# Patient Record
Sex: MALE | Race: WHITE
[De-identification: names, ages, dates, MRNs, and addresses within clinical notes are randomized per-mention and may not be internally consistent; named-entity substitution may affect disease eponyms.]

---

## 2017-09-20 ENCOUNTER — HOSPITAL ENCOUNTER (OUTPATIENT)
Dept: HOSPITAL 17 - HRAD | Age: 22
End: 2017-09-20
Attending: NEUROLOGICAL SURGERY
Payer: COMMERCIAL

## 2017-09-20 DIAGNOSIS — G91.9: Primary | ICD-10-CM

## 2017-09-20 DIAGNOSIS — F07.81: ICD-10-CM

## 2017-09-20 PROCEDURE — 70450 CT HEAD/BRAIN W/O DYE: CPT

## 2017-09-20 NOTE — RADRPT
EXAM DATE/TIME:  09/20/2017 14:20 

 

[facility] COMPARISON:     

CT BRAIN W/O CONTRAST, July 25, 2016, 15:25.

 

 

INDICATIONS :     

Head pressure with shunt.  History of traumatic brain injury. 

                      

 

RADIATION DOSE:     

41.23 CTDIvol (mGy) 

 

 

 

MEDICAL HISTORY :       

TBI

 

SURGICAL HISTORY :       

Shunt

 

ENCOUNTER:      

Initial

 

ACUITY:      

1 month

 

PAIN SCALE:      

3/10

 

LOCATION:       

Bilateral cranial 

 

TECHNIQUE:     

Multiple contiguous axial images were obtained of the head.  Using automated exposure control and adj
ustment of the mA and/or kV according to patient size, radiation dose was kept as low as reasonably a
chievable to obtain optimal diagnostic quality images.   DICOM format image data is available electro
nically for review and comparison.  

 

FINDINGS:     

 

CEREBRUM:     

Shunt entering from the right with stable right basalganglia infarct.  Ventricle size is appropriate.
  There is no new parenchymal hemorrhage.  There is no significant mass effect..

 

POSTERIOR FOSSA:     

The cerebellum and brainstem are intact.  The 4th ventricle is midline.  The cerebellopontine angle i
s unremarkable.

 

EXTRACRANIAL:     

The visualized portion of the orbits is intact.

 

SKULL:     

The calvaria is intact.  No evidence of skull fracture.

 

CONCLUSION:     

Shunt entering from the right stable when compared to 7/25/16 with old infarct basalganglia right tammie
e.

 

 

 

 Ace Justice MD FACR on September 20, 2017 at 14:49           

Board Certified Radiologist.

 This report was verified electronically.

## 2018-03-16 ENCOUNTER — HOSPITAL ENCOUNTER (INPATIENT)
Dept: HOSPITAL 17 - NEPC | Age: 23
LOS: 2 days | Discharge: HOME | DRG: 100 | End: 2018-03-18
Attending: HOSPITALIST | Admitting: HOSPITALIST
Payer: COMMERCIAL

## 2018-03-16 VITALS
OXYGEN SATURATION: 100 % | SYSTOLIC BLOOD PRESSURE: 147 MMHG | HEART RATE: 134 BPM | RESPIRATION RATE: 15 BRPM | DIASTOLIC BLOOD PRESSURE: 93 MMHG

## 2018-03-16 VITALS
TEMPERATURE: 100.6 F | HEART RATE: 135 BPM | RESPIRATION RATE: 12 BRPM | OXYGEN SATURATION: 100 % | SYSTOLIC BLOOD PRESSURE: 122 MMHG | DIASTOLIC BLOOD PRESSURE: 77 MMHG

## 2018-03-16 VITALS
OXYGEN SATURATION: 90 % | DIASTOLIC BLOOD PRESSURE: 84 MMHG | HEART RATE: 130 BPM | RESPIRATION RATE: 18 BRPM | SYSTOLIC BLOOD PRESSURE: 149 MMHG | TEMPERATURE: 98.3 F

## 2018-03-16 VITALS — SYSTOLIC BLOOD PRESSURE: 114 MMHG | DIASTOLIC BLOOD PRESSURE: 60 MMHG

## 2018-03-16 VITALS — OXYGEN SATURATION: 99 %

## 2018-03-16 VITALS — DIASTOLIC BLOOD PRESSURE: 80 MMHG | HEART RATE: 117 BPM | SYSTOLIC BLOOD PRESSURE: 119 MMHG

## 2018-03-16 VITALS — BODY MASS INDEX: 21.9 KG/M2 | WEIGHT: 153 LBS | HEIGHT: 70 IN

## 2018-03-16 VITALS — OXYGEN SATURATION: 98 %

## 2018-03-16 VITALS — OXYGEN SATURATION: 100 %

## 2018-03-16 VITALS — SYSTOLIC BLOOD PRESSURE: 126 MMHG | HEART RATE: 115 BPM | DIASTOLIC BLOOD PRESSURE: 75 MMHG

## 2018-03-16 DIAGNOSIS — Z88.5: ICD-10-CM

## 2018-03-16 DIAGNOSIS — Z87.820: ICD-10-CM

## 2018-03-16 DIAGNOSIS — G40.911: Primary | ICD-10-CM

## 2018-03-16 DIAGNOSIS — G47.01: ICD-10-CM

## 2018-03-16 DIAGNOSIS — R41.89: ICD-10-CM

## 2018-03-16 DIAGNOSIS — S06.9X0S: ICD-10-CM

## 2018-03-16 DIAGNOSIS — Z98.2: ICD-10-CM

## 2018-03-16 DIAGNOSIS — J96.90: ICD-10-CM

## 2018-03-16 DIAGNOSIS — G81.14: ICD-10-CM

## 2018-03-16 LAB
ALBUMIN SERPL-MCNC: 4.2 GM/DL (ref 3.4–5)
ALP SERPL-CCNC: 98 U/L (ref 45–117)
ALT SERPL-CCNC: 74 U/L (ref 12–78)
AST SERPL-CCNC: 36 U/L (ref 15–37)
BASOPHILS # BLD AUTO: 0 TH/MM3 (ref 0–0.2)
BASOPHILS NFR BLD: 0.3 % (ref 0–2)
BILIRUB SERPL-MCNC: 0.8 MG/DL (ref 0.2–1)
BUN SERPL-MCNC: 13 MG/DL (ref 7–18)
CALCIUM SERPL-MCNC: 8.9 MG/DL (ref 8.5–10.1)
CHLORIDE SERPL-SCNC: 103 MEQ/L (ref 98–107)
CREAT SERPL-MCNC: 1.2 MG/DL (ref 0.6–1.3)
EOSINOPHIL # BLD: 0.3 TH/MM3 (ref 0–0.4)
EOSINOPHIL NFR BLD: 2.7 % (ref 0–4)
ERYTHROCYTE [DISTWIDTH] IN BLOOD BY AUTOMATED COUNT: 13.5 % (ref 11.6–17.2)
GFR SERPLBLD BASED ON 1.73 SQ M-ARVRAT: 52 ML/MIN (ref 89–?)
GLUCOSE SERPL-MCNC: 128 MG/DL (ref 74–106)
HCO3 BLD-SCNC: 20 MEQ/L (ref 21–32)
HCT VFR BLD CALC: 47 % (ref 39–51)
HGB BLD-MCNC: 16.1 GM/DL (ref 13–17)
LYMPHOCYTES # BLD AUTO: 3.7 TH/MM3 (ref 1–4.8)
LYMPHOCYTES NFR BLD AUTO: 39 % (ref 9–44)
MCH RBC QN AUTO: 29.9 PG (ref 27–34)
MCHC RBC AUTO-ENTMCNC: 34.2 % (ref 32–36)
MCV RBC AUTO: 87.5 FL (ref 80–100)
MONOCYTE #: 0.7 TH/MM3 (ref 0–0.9)
MONOCYTES NFR BLD: 7.9 % (ref 0–8)
NEUTROPHILS # BLD AUTO: 4.7 TH/MM3 (ref 1.8–7.7)
NEUTROPHILS NFR BLD AUTO: 50.1 % (ref 16–70)
PLATELET # BLD: 247 TH/MM3 (ref 150–450)
PMV BLD AUTO: 8.8 FL (ref 7–11)
PROT SERPL-MCNC: 7.9 GM/DL (ref 6.4–8.2)
RBC # BLD AUTO: 5.38 MIL/MM3 (ref 4.5–5.9)
SODIUM SERPL-SCNC: 142 MEQ/L (ref 136–145)
WBC # BLD AUTO: 9.4 TH/MM3 (ref 4–11)

## 2018-03-16 PROCEDURE — 82805 BLOOD GASES W/O2 SATURATION: CPT

## 2018-03-16 PROCEDURE — 71045 X-RAY EXAM CHEST 1 VIEW: CPT

## 2018-03-16 PROCEDURE — 95819 EEG AWAKE AND ASLEEP: CPT

## 2018-03-16 PROCEDURE — 70450 CT HEAD/BRAIN W/O DYE: CPT

## 2018-03-16 PROCEDURE — 80053 COMPREHEN METABOLIC PANEL: CPT

## 2018-03-16 PROCEDURE — 31500 INSERT EMERGENCY AIRWAY: CPT

## 2018-03-16 PROCEDURE — 84100 ASSAY OF PHOSPHORUS: CPT

## 2018-03-16 PROCEDURE — 94002 VENT MGMT INPAT INIT DAY: CPT

## 2018-03-16 PROCEDURE — 82948 REAGENT STRIP/BLOOD GLUCOSE: CPT

## 2018-03-16 PROCEDURE — 80307 DRUG TEST PRSMV CHEM ANLYZR: CPT

## 2018-03-16 PROCEDURE — 87641 MR-STAPH DNA AMP PROBE: CPT

## 2018-03-16 PROCEDURE — 96368 THER/DIAG CONCURRENT INF: CPT

## 2018-03-16 PROCEDURE — 96365 THER/PROPH/DIAG IV INF INIT: CPT

## 2018-03-16 PROCEDURE — 83735 ASSAY OF MAGNESIUM: CPT

## 2018-03-16 PROCEDURE — 85025 COMPLETE CBC W/AUTO DIFF WBC: CPT

## 2018-03-16 PROCEDURE — 0BH17EZ INSERTION OF ENDOTRACHEAL AIRWAY INTO TRACHEA, VIA NATURAL OR ARTIFICIAL OPENING: ICD-10-PCS | Performed by: EMERGENCY MEDICINE

## 2018-03-16 PROCEDURE — 43752 NASAL/OROGASTRIC W/TUBE PLMT: CPT

## 2018-03-16 PROCEDURE — 99292 CRITICAL CARE ADDL 30 MIN: CPT

## 2018-03-16 PROCEDURE — 36600 WITHDRAWAL OF ARTERIAL BLOOD: CPT

## 2018-03-16 PROCEDURE — 5A1935Z RESPIRATORY VENTILATION, LESS THAN 24 CONSECUTIVE HOURS: ICD-10-PCS | Performed by: INTERNAL MEDICINE

## 2018-03-16 RX ADMIN — STANDARDIZED SENNA CONCENTRATE AND DOCUSATE SODIUM SCH TAB: 8.6; 5 TABLET, FILM COATED ORAL at 21:00

## 2018-03-16 RX ADMIN — METOPROLOL TARTRATE SCH MG: 1 INJECTION, SOLUTION INTRAVENOUS at 23:41

## 2018-03-16 RX ADMIN — Medication SCH ML: at 21:00

## 2018-03-16 RX ADMIN — PHENYTOIN SODIUM SCH MLS/HR: 50 INJECTION INTRAMUSCULAR; INTRAVENOUS at 20:53

## 2018-03-16 RX ADMIN — ENOXAPARIN SODIUM SCH MG: 40 INJECTION SUBCUTANEOUS at 23:09

## 2018-03-16 NOTE — RADRPT
EXAM DATE/TIME:  03/16/2018 17:12 

 

[facility] COMPARISON:     

No previous studies available for comparison.

 

 

INDICATIONS :     

Epilepticus, seizure

                      

 

RADIATION DOSE:     

47.50 CTDIvol (mGy) 

 

 

 

MEDICAL HISTORY :       

TBI 2 yrs ago,

 

SURGICAL HISTORY :      

None. 

 

ENCOUNTER:      

Initial

 

ACUITY:      

1 day

 

PAIN SCALE:      

Non-responsive

 

LOCATION:        

cranial 

 

TECHNIQUE:     

Multiple contiguous axial images were obtained of the head.  Using automated exposure control and adj
ustment of the mA and/or kV according to patient size, radiation dose was kept as low as reasonably a
chievable to obtain optimal diagnostic quality images.   DICOM format image data is available electro
nically for review and comparison.  

 

FINDINGS:     

The examination demonstrates old there is a cortical infarct involving the left frontal cortex and th
e right temporal cortex. There is no acute intracranial hemorrhage. No mass lesion is identified. The
re is a ventriculostomy shunt in satisfactory position. The ventricles are actually quite small in si
ze.

 

No abnormal fluid collections are seen.

 

The appearance of the posterior fossa is unremarkable.

 

The osseous structures of the skull are grossly intact.

 

CONCLUSION:     

1. No acute cranial hemorrhage.

2. 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

3. No acute intracranial abnormality identified.

 

 

 

 Chirag Rosa MD on March 16, 2018 at 17:26           

Board Certified Radiologist.

 This report was verified electronically.

## 2018-03-16 NOTE — PD
HPI


Chief Complaint:  Seizure


Time Seen by Provider:  16:20


Travel History


International Travel<30 days:  No


Contact w/Intl Traveler<30days:  No


Traveled to known affect area:  No





History of Present Illness


HPI


This patient presents emergently by paramedics.  He is critically ill and being 

bagged on the way in.  He has history of traumatic brain injury.  He had a 

seizure 1 time one year ago but is not a typical seizure patient and takes no 

antiepileptics.  Family witnessed him having seizure activity and called the 

paramedics.  He was witnessed to have tonic-clonic shaking activity in the 

extremities by paramedics.  He bit his tongue and was having a lot of bleeding 

in the oral cavity.  He could not control his airway.  He was gurgling and they 

tried to intubate him unsuccessfully.  They gave him etomidate just prior to 

arrival.  He is being bagged when he enters the ER room.  He cannot provide any 

history or review of systems.  He is critically ill in need of emergent airway.





[facility]


Social History


Alcohol Use:  No


Tobacco Use:  No


Substance Use:  No





Allergies-Medications


(Allergen,Severity, Reaction):  


Coded Allergies:  


     morphine (Verified  Allergy, Severe, 3/16/18)


Reported Meds & Prescriptions





Reported Meds & Active Scripts


Active


Reported


Gabapentin 100 Mg Cap 100 Mg PO TID








Review of Systems


ROS Limitations:  Clinical Condition, Altered Mental Status, Unresponsive





Physical Exam


Narrative


GENERAL: Thin unresponsive male with abnormal gurgling respiratory effort.  He 

seems to be choking on blood or secretions.


SKIN: Focused skin assessment reveals no rash and nodules. Skin is Warm and dry.


HEAD: Atraumatic. Normocephalic. 


EYES: Pupils equal and round. No scleral icterus. No injection or drainage. 


ENT: No nasal bleeding or discharge.  Mucous membranes pink and moist.  No gag 

reflex.  Dried blood around the face. 


NECK: Trachea midline. No JVD.  He has an old trach scar


CARDIOVASCULAR: Regular rate and rhythm.  No murmur appreciated.


RESPIRATORY: No accessory muscle use. Clear to auscultation. Breath sounds 

equal bilaterally. 


GASTROINTESTINAL: Abdomen soft, non-tender, nondistended. Hepatic and splenic 

margins not palpable. 


MUSCULOSKELETAL: No obvious deformities. No clubbing.  No cyanosis.  No edema. 


NEUROLOGICAL: GCS 3, he just received etomidate and has had 4 mg of Versed.  No 

gag reflex.  Impossible to test motor strength or sensation.


PSYCHIATRIC: Unresponsive so unable to test  mood and affect; insight and 

judgment poor .





Data


Data


Last Documented VS





Vital Signs








  Date Time  Temp Pulse Resp B/P (MAP) Pulse Ox O2 Delivery O2 Flow Rate FiO2


 


3/16/18 18:20  115  126/75 (92)    


 


3/16/18 17:00     98   100


 


3/16/18 16:32   15     


 


3/16/18 16:23 98.3       








Orders





 Orders


Propofol 500 Mg/50 Ml Inj (Diprivan 500 (3/16/18 16:15)


Phenytoin Inj (Dilantin Inj) (3/16/18 16:30)


Ct Brain W/O Iv Contrast(Rout) (3/16/18 )


Iv Access Insert/Monitor (3/16/18 16:20)


Complete Blood Count With Diff (3/16/18 16:20)


Comprehensive Metabolic Panel (3/16/18 16:20)


Propofol 1000 Mg/100 Ml Inj (Diprivan 10 (3/16/18 16:30)


Alcohol (Ethanol) (3/16/18 16:20)


Drug Screen, Random Urine (3/16/18 16:20)


Urinary Catheter Insert/Apply (3/16/18 16:20)


Arterial Blood Gas (Abg) (3/16/18 )


Chest, Single Ap (3/16/18 )


Succinylcholine Inj (Quelicin Inj) (3/16/18 18:30)





Labs





Laboratory Tests








Test


  3/16/18


17:15 3/16/18


17:30 3/16/18


17:34


 


White Blood Count 9.4 TH/MM3   


 


Red Blood Count 5.38 MIL/MM3   


 


Hemoglobin 16.1 GM/DL   


 


Hematocrit 47.0 %   


 


Mean Corpuscular Volume 87.5 FL   


 


Mean Corpuscular Hemoglobin 29.9 PG   


 


Mean Corpuscular Hemoglobin


Concent 34.2 % 


  


  


 


 


Red Cell Distribution Width 13.5 %   


 


Platelet Count 247 TH/MM3   


 


Mean Platelet Volume 8.8 FL   


 


Neutrophils (%) (Auto) 50.1 %   


 


Lymphocytes (%) (Auto) 39.0 %   


 


Monocytes (%) (Auto) 7.9 %   


 


Eosinophils (%) (Auto) 2.7 %   


 


Basophils (%) (Auto) 0.3 %   


 


Neutrophils # (Auto) 4.7 TH/MM3   


 


Lymphocytes # (Auto) 3.7 TH/MM3   


 


Monocytes # (Auto) 0.7 TH/MM3   


 


Eosinophils # (Auto) 0.3 TH/MM3   


 


Basophils # (Auto) 0.0 TH/MM3   


 


CBC Comment DIFF FINAL   


 


Differential Comment    


 


Blood Urea Nitrogen 13 MG/DL   


 


Creatinine 1.20 MG/DL   


 


Random Glucose 128 MG/DL   


 


Total Protein 7.9 GM/DL   


 


Albumin 4.2 GM/DL   


 


Calcium Level 8.9 MG/DL   


 


Alkaline Phosphatase 98 U/L   


 


Aspartate Amino Transf


(AST/SGOT) 36 U/L 


  


  


 


 


Alanine Aminotransferase


(ALT/SGPT) 74 U/L 


  


  


 


 


Total Bilirubin 0.8 MG/DL   


 


Sodium Level 142 MEQ/L   


 


Potassium Level 3.7 MEQ/L   


 


Chloride Level 103 MEQ/L   


 


Carbon Dioxide Level 20.0 MEQ/L   


 


Anion Gap 19 MEQ/L   


 


Estimat Glomerular Filtration


Rate 52 ML/MIN 


  


  


 


 


Ethyl Alcohol Level


  LESS THAN 3


MG/DL 


  


 


 


Urine Opiates Screen  NEG  


 


Urine Barbiturates Screen  NEG  


 


Urine Amphetamines Screen  NEG  


 


Urine Benzodiazepines Screen  POS  


 


Urine Cocaine Screen  NEG  


 


Urine Cannabinoids Screen  NEG  


 


Blood Gas Puncture Site   RT RADIAL 


 


Blood Gas Patient Temperature   98.6 


 


Blood Gas HCO3   26 mmol/L 


 


Blood Gas Base Excess   1.7 mmol/L 


 


Blood Gas Oxygen Saturation   99 % 


 


Arterial Blood pH   7.40 


 


Arterial Blood Partial


Pressure CO2 


  


  42 mmHg 


 


 


Arterial Blood Partial


Pressure O2 


  


  303 mmHG 


 


 


Arterial Blood Oxygen Content   22.7 Vol % 


 


Arterial Blood


Carboxyhemoglobin 


  


  0.7 % 


 


 


Arterial Blood Methemoglobin   0.7 % 


 


Blood Gas Hemoglobin   15.9 G/DL 


 


Oxygen Delivery Device   VENTILATOR 


 


Blood Gas Ventilator Setting


  


  


  VAC/14/500/+5/100%


 


 


Blood Gas Inspired Oxygen   100 % 











[facility]


Medical Decision Making


Medical Screen Exam Complete:  Yes


Emergency Medical Condition:  Yes


Medical Record Reviewed:  Yes


Differential Diagnosis


Status epilepticus, loss of airway, tonic-clonic seizure


Narrative Course


I have reviewed the patient's electronic medical record.





This patient arrives critically ill.  He is an emergent need of airway








INTUBATION: The patient was put in optimal position for the procedure.  Rapid 

sequence intubation was initiated by me using 100  milligrams of 

succinylcholine IV.  The patient was intubated with a 8-0 cuffed endotracheal 

tube.  Tube placement was confirmed by visualization of the tube and balloon 

passing through the cords, capnometry and subsequent chest x-ray.  Breath 

sounds were equal and well aerated bilaterally postintubation.  No breath 

sounds over stomach.  Patient tolerated procedure well.


Patient had bit his tongue and there was some blood in the oral cavity but 

intubation was not a problem





I have ordered lab studies and brain CT


I have loaded him with 1 g IV Dilantin


I started him on Diprivan drip for sedation





At this point it appears that the seizure has stopped





Brain CT is negative


Lab studies are normal


ABG looks normal


We will wean his oxygen down a bit





I reviewed with mother at bedside and answered her questions


Patient will be admitted to intensive care on ventilator


I discussed with intensivist


Critical Care Narrative


Aggregate critical care time was 80 minutes. Time to perform other separately 

billable procedures was not included in the critical care time. My time did not 

include minutes spent treating any other patients simultaneously or on 

activities that did not directly contribute to the patient's treatment.  





The services I provided to this patient were to treat and/or prevent clinically 

significant deterioration that could result in: Permanent neurologic dysfunction

, brain injury, cardiopulmonary arrest





I provided critical care services requiring my management, as noted below:


Chart data review, documentation time, medication orders and management, vital 

sign assessments/reviewing monitor data, ordering and reviewing lab tests, 

ordering and interpreting/reviewing x-rays and diagnostic studies, care of the 

patient and discussion of the patient with the admitting physicians.





Diagnosis





 Primary Impression:  


 Status epilepticus


 Additional Impressions:  


 Hx of traumatic brain injury


 Airway compromise





Admitting Information


Admitting Physician Requests:  Admit











Aman Neal MD Mar 16, 2018 16:34

## 2018-03-16 NOTE — RADRPT
EXAM DATE/TIME:  03/16/2018 16:36 

 

[facility] COMPARISON:     

No previous studies available for comparison.

 

                     

INDICATIONS :     

Respiratory failure. ET tube placement.

                     

 

MEDICAL HISTORY :     

None.          

 

SURGICAL HISTORY :     

None.   

 

ENCOUNTER:     

Subsequent                                        

 

ACUITY:     

1 day      

 

PAIN SCORE:     

Non-responsive.

 

LOCATION:     

Bilateral chest 

 

FINDINGS:     

Endotracheal tube in good position. NG enters stomach. Right-sided shunt tubing present. Mild basilar
 airspace disease. No significant effusion. No pneumothorax.

 

CONCLUSION:     

1. Support apparatus as above. Mild basilar airspace disease.

 

 

 

 Eron Abreu MD on March 16, 2018 at 17:31           

Board Certified Radiologist.

 This report was verified electronically.

## 2018-03-16 NOTE — HHI.HP
HPI


Service


Critical Care Medicine


Primary Care Physician


Unknown


Admission Diagnosis





status epilepticus,loss of airway, hx TBI


Diagnosis:  


Travel History


International Travel<30 Days:  No


Contact w/Intl Traveler <30 Da:  No


Traveled to Known Affected Are:  No


History of Present Illness


Young gentleman with the history of traumatic brain injury 2 years ago, he was 

on a Procedure prophylaxis for about 1 year, he presents today with the new 

onset of witnessed seizure activity. He was witnessed to have tonic-clonic 

shaking activity in the extremities by paramedics.  He bit his tongue and was 

having a lot of bleeding in the oral cavity.  He could not control his airway.  

There was attempt by paramedics to intubate a patient however unsuccessful.  

Patient received etomidate just prior to arrival to emergency department, was 

backed and was intubated by ED attending for an airway protection.





Review of Systems


ROS


To obtain patient is sedated and intubated





Past Family Social History


Allergies:  


Coded Allergies:  


     morphine (Verified  Allergy, Severe, 3/16/18)


Past Medical History


Traumatic brain injury


Past Surgical History


Craniotomy and  shunt


Reported Medications





Reported Meds & Active Scripts


Active


Reported


Gabapentin 100 Mg Cap 100 Mg PO TID


Active Ordered Medications





Current Medications








 Medications


  (Trade)  Dose


 Ordered  Sig/Josi


 Route


 PRN Reason  Start Time


 Stop Time Status Last Admin


Dose Admin


 


 Propofol  100 ml @ 0


 mls/hr  TITRATE  PRN


 IV


 SEDATION  3/16/18 16:30


    3/16/18 17:51


 


 


 Gabapentin


  (Neurontin)  100 mg  TID


 PO


   3/17/18 09:00


     


 


 


 Sodium Chloride  1,000 ml @ 


 84 mls/hr  [id number]


 IV


   3/16/18 19:13


     


 


 


 Sodium Chloride


  (NS Flush)  2 ml  UNSCH  PRN


 IV FLUSH


 FLUSH AFTER USING IV ACCESS  3/16/18 19:15


     


 


 


 Sodium Chloride


  (NS Flush)  2 ml  BID


 IV FLUSH


   3/16/18 21:00


     


 


 


 Acetaminophen


  (Tylenol)  650 mg  Q6H  PRN


 PO


 PAIN 1-10 AND/OR FEVER >101F  3/16/18 19:15


     


 


 


 Lorazepam


  (Ativan Inj)  1 mg  Q1H  PRN


 IV PUSH


 Seizure  3/16/18 19:15


     


 


 


 Ondansetron HCl


  (Zofran Inj)  4 mg  Q6H  PRN


 IV PUSH


 NAUSEA OR VOMITING  3/16/18 19:15


     


 


 


 Zolpidem Tartrate


  (Ambien)  5 mg  HS  PRN


 PO


 INSOMNIA  3/16/18 19:15


     


 


 


 Albuterol/


 Ipratropium


  (Duoneb Neb)  1 ampule  Q2HR NEB  PRN


 INH


 WHEEZING  3/16/18 19:15


     


 


 


 Enoxaparin Sodium


  (Lovenox Inj)  40 mg  Q24H


 SQ


   3/16/18 21:00


     


 


 


 Miscellaneous


 Information  1  Q361D


 XX


   3/16/18 19:15


     


 


 


 Chlorhexidine


 Gluconate


  (Chlorhexidine


 2% Cloth)  3 pack


 Taper  DAILY@04


 TOP


   3/17/18 04:00


 3/13/19 03:59   


 


 


 Chlorhexidine


 Gluconate


  (Chlorhexidine


 2% Cloth)  3 pack  UNSCH  PRN


 TOP


 HYGIENIC CARE  3/16/18 19:15


     


 


 


 Senna/Docusate


 Sodium


  (Elizabeth-Colace)  1 tab  BID


 PO


   3/16/18 21:00


     


 


 


 Magnesium


 Hydroxide


  (Milk Of


 Magnesia Liq)  30 ml  Q12H  PRN


 PO


 Mild constipation  3/16/18 19:15


     


 


 


 Sennosides


  (Senokot)  17.2 mg  Q12H  PRN


 PO


 Moderate constipation  3/16/18 19:15


     


 


 


 Bisacodyl


  (Dulcolax Supp)  10 mg  DAILY  PRN


 RECTAL


 SEVERE CONSITIPATION  3/16/18 19:15


     


 


 


 Lactulose


  (Lactulose Liq)  30 ml  DAILY  PRN


 PO


 SEVERE CONSITIPATION  3/16/18 19:15


     


 


 


 Levetriacetam


  (Keppra)  500 mg  Q12HR


 PO


   3/16/18 21:00


     


 








Family History


No family history significant of seizure disorder


Social History


No alcohol, tobacco, or illicit drug abuse





Physical Exam


Vital Signs





Vital Signs








  Date Time  Temp Pulse Resp B/P (MAP) Pulse Ox O2 Delivery O2 Flow Rate FiO2


 


3/16/18 18:20  115  126/75 (92)    


 


3/16/18 17:45  117  119/80 (93)    


 


3/16/18 17:00     98   100


 


3/16/18 16:32  134 15 147/93 (111) 100   


 


3/16/18 16:32  130      


 


3/16/18 16:23 98.3 130 18 149/84 (105) 90   


 


3/16/18 16:20     100   100








Physical Exam


GENERAL: Thin unresponsive male sedated and intubated


SKIN: Focused skin assessment reveals no rash and nodules. Skin is Warm and dry.


HEAD: Atraumatic. Normocephalic. 


EYES: Pupils equal and round. No scleral icterus. No injection or drainage. 


ENT: No nasal bleeding or discharge.  Mucous membranes pink and moist.  No gag 

reflex.  Dried blood around the face. 


NECK: Trachea midline. No JVD.  He has an old trach scar


CARDIOVASCULAR: Regular rate and rhythm.  No murmur appreciated.


RESPIRATORY: No accessory muscle use. Clear to auscultation. Breath sounds 

equal bilaterally. 


GASTROINTESTINAL: Abdomen soft, non-tender, nondistended. Hepatic and splenic 

margins not palpable. 


MUSCULOSKELETAL: No obvious deformities. No clubbing.  No cyanosis.  No edema. 


NEUROLOGICAL: GCS 5, sedated and intubated, pupils are symmetrical and reactive 

to light


Laboratory





Laboratory Tests








Test


  3/16/18


17:15 3/16/18


17:30 3/16/18


17:34


 


White Blood Count 9.4   


 


Red Blood Count 5.38   


 


Hemoglobin 16.1   


 


Hematocrit 47.0   


 


Mean Corpuscular Volume 87.5   


 


Mean Corpuscular Hemoglobin 29.9   


 


Mean Corpuscular Hemoglobin


Concent 34.2 


  


  


 


 


Red Cell Distribution Width 13.5   


 


Platelet Count 247   


 


Mean Platelet Volume 8.8   


 


Neutrophils (%) (Auto) 50.1   


 


Lymphocytes (%) (Auto) 39.0   


 


Monocytes (%) (Auto) 7.9   


 


Eosinophils (%) (Auto) 2.7   


 


Basophils (%) (Auto) 0.3   


 


Neutrophils # (Auto) 4.7   


 


Lymphocytes # (Auto) 3.7   


 


Monocytes # (Auto) 0.7   


 


Eosinophils # (Auto) 0.3   


 


Basophils # (Auto) 0.0   


 


CBC Comment DIFF FINAL   


 


Differential Comment    


 


Blood Urea Nitrogen 13   


 


Creatinine 1.20   


 


Random Glucose 128   


 


Total Protein 7.9   


 


Albumin 4.2   


 


Calcium Level 8.9   


 


Alkaline Phosphatase 98   


 


Aspartate Amino Transf


(AST/SGOT) 36 


  


  


 


 


Alanine Aminotransferase


(ALT/SGPT) 74 


  


  


 


 


Total Bilirubin 0.8   


 


Sodium Level 142   


 


Potassium Level 3.7   


 


Chloride Level 103   


 


Carbon Dioxide Level 20.0   


 


Anion Gap 19   


 


Estimat Glomerular Filtration


Rate 52 


  


  


 


 


Ethyl Alcohol Level LESS THAN 3   


 


Urine Opiates Screen  NEG  


 


Urine Barbiturates Screen  NEG  


 


Urine Amphetamines Screen  NEG  


 


Urine Benzodiazepines Screen  POS  


 


Urine Cocaine Screen  NEG  


 


Urine Cannabinoids Screen  NEG  


 


Blood Gas Puncture Site   RT RADIAL 


 


Blood Gas Patient Temperature   98.6 


 


Blood Gas HCO3   26 


 


Blood Gas Base Excess   1.7 


 


Blood Gas Oxygen Saturation   99 


 


Arterial Blood pH   7.40 


 


Arterial Blood Partial


Pressure CO2 


  


  42 


 


 


Arterial Blood Partial


Pressure O2 


  


  303 


 


 


Arterial Blood Oxygen Content   22.7 


 


Arterial Blood


Carboxyhemoglobin 


  


  0.7 


 


 


Arterial Blood Methemoglobin   0.7 


 


Blood Gas Hemoglobin   15.9 


 


Oxygen Delivery Device   VENTILATOR 


 


Blood Gas Ventilator Setting


  


  


  VAC/14/500/+5/100%


 


 


Blood Gas Inspired Oxygen   100 








Result Diagram:  


3/16/18 1715                                                                   

             3/16/18 1715





Imaging





Last 24 hours Impressions








Head CT 3/16/18 0000 Signed





Impressions: 





 Service Date/Time:  2018 17:12 - CONCLUSION:  1. No acute 





 cranial hemorrhage. 2. 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 





 and right temporal cortex. 3. No acute intracranial abnormality identified.   

  





 Chirag Rosa MD 


 


Chest X-Ray 3/16/18 0000 Signed





Impressions: 





 Service Date/Time:  2018 16:36 - CONCLUSION:  1. Support 





 apparatus as above. Mild basilar airspace disease.     Eron Abreu MD 











Septic Shock Reassessment


Septic shock perfusion:  reassessment completed





Caprini VTE Risk Assessment


Caprini VTE Risk Assessment:  Mod/High Risk (score >= 2)


Caprini Risk Assessment Model











 Point Value = 1          Point Value = 2  Point Value = 3  Point Value = 5


 


Age 41-60


Minor surgery


BMI > 25 kg/m2


Swollen legs


Varicose veins


Pregnancy or postpartum


History of unexplained or recurrent


   spontaneous 


Oral contraceptives or hormone


   replacement


Sepsis (< 1 month)


Serious lung disease, including


   pneumonia (< 1 month)


Abnormal pulmonary function


Acute myocardial infarction


Congestive heart failure (< 1 month)


History of inflammatory bowel disease


Medical patient at bed rest Age 61-74


Arthroscopic surgery


Major open surgery (> 45 min)


Laparoscopic surgery (> 45 min)


Malignancy


Confined to bed (> 72 hours)


Immobilizing plaster cast


Central venous access Age >= 75


History of VTE


Family history of VTE


Factor V Leiden


Prothrombin 51314D


Lupus anticoagulant


Anticardiolipin antibodies


Elevated serum homocysteine


Heparin-induced thrombocytopenia


Other congenital or acquired


   thrombophilia Stroke (< 1 month)


Elective arthroplasty


Hip, pelvis, or leg fracture


Acute spinal cord injury (< 1 month)








Prophylaxis Regimen











   Total Risk


Factor Score Risk Level Prophylaxis Regimen


 


0-1      Low Early ambulation


 


2 Moderate Order ONE of the following:


*Sequential Compression Device (SCD)


*Heparin 5000 units SQ BID


 


3-4 Higher Order ONE of the following medications:


*Heparin 5000 units SQ TID


*Enoxaparin/Lovenox 40 mg SQ daily (WT < 150 kg, CrCl > 30 mL/min)


*Enoxaparin/Lovenox 30 mg SQ daily (WT < 150 kg, CrCl > 10-29 mL/min)


*Enoxaparin/Lovenox 30 mg SQ BID (WT < 150 kg, CrCl > 30 mL/min)


AND/OR


*Sequential Compression Device (SCD)


 


5 or more Highest Order ONE of the following medications:


*Heparin 5000 units SQ TID (Preferred with Epidurals)


*Enoxaparin/Lovenox 40 mg SQ daily (WT < 150 kg, CrCl > 30 mL/min)


*Enoxaparin/Lovenox 30 mg SQ daily (WT < 150 kg, CrCl > 10-29 mL/min)


*Enoxaparin/Lovenox 30 mg SQ BID (WT < 150 kg, CrCl > 30 mL/min)


AND


*Sequential Compression Device (SCD)











Assessment and Plan


Assessment and Plan


Respiratory failure


- Intubated for an airway protection


- Attempt to wean off and extubate when seizure-free


- Vent bundle until extubated





Seizure disorder


- Loaded with 1 g of fosphenytoin in the ED


- Keppra 500 by mouth twice a day


- Seizure precaution


- Ativan when necessary





Traumatic brain injury


- Supportive care


- PT and OT


- Gabapentin





DVT GI prophylaxis


- Teds SCDs


- Early aggressive mobilization


- Subcutaneous Lovenox


- Regular diet





Critical Care:


The total critical care time was 35 minutes. Time to perform other separately 

billable procedures was not included in the critical care time.











Vishal Scott MD Mar 16, 2018 7:26 pm

## 2018-03-17 VITALS
OXYGEN SATURATION: 95 % | RESPIRATION RATE: 16 BRPM | TEMPERATURE: 99.4 F | DIASTOLIC BLOOD PRESSURE: 69 MMHG | SYSTOLIC BLOOD PRESSURE: 112 MMHG | HEART RATE: 111 BPM

## 2018-03-17 VITALS
SYSTOLIC BLOOD PRESSURE: 101 MMHG | TEMPERATURE: 98.7 F | HEART RATE: 104 BPM | RESPIRATION RATE: 12 BRPM | OXYGEN SATURATION: 97 % | DIASTOLIC BLOOD PRESSURE: 63 MMHG

## 2018-03-17 VITALS
TEMPERATURE: 98.9 F | RESPIRATION RATE: 12 BRPM | SYSTOLIC BLOOD PRESSURE: 118 MMHG | OXYGEN SATURATION: 97 % | HEART RATE: 113 BPM | DIASTOLIC BLOOD PRESSURE: 57 MMHG

## 2018-03-17 VITALS
SYSTOLIC BLOOD PRESSURE: 109 MMHG | TEMPERATURE: 97.9 F | RESPIRATION RATE: 16 BRPM | OXYGEN SATURATION: 98 % | DIASTOLIC BLOOD PRESSURE: 73 MMHG | HEART RATE: 102 BPM

## 2018-03-17 VITALS
TEMPERATURE: 98.6 F | SYSTOLIC BLOOD PRESSURE: 109 MMHG | RESPIRATION RATE: 17 BRPM | OXYGEN SATURATION: 94 % | HEART RATE: 120 BPM | DIASTOLIC BLOOD PRESSURE: 65 MMHG

## 2018-03-17 VITALS
TEMPERATURE: 98.6 F | RESPIRATION RATE: 15 BRPM | HEART RATE: 116 BPM | SYSTOLIC BLOOD PRESSURE: 104 MMHG | DIASTOLIC BLOOD PRESSURE: 70 MMHG | OXYGEN SATURATION: 97 %

## 2018-03-17 VITALS — TEMPERATURE: 98.9 F

## 2018-03-17 VITALS — HEART RATE: 114 BPM

## 2018-03-17 VITALS — TEMPERATURE: 99.5 F

## 2018-03-17 VITALS — HEART RATE: 118 BPM

## 2018-03-17 LAB
ALBUMIN SERPL-MCNC: 3.6 GM/DL (ref 3.4–5)
ALP SERPL-CCNC: 73 U/L (ref 45–117)
ALT SERPL-CCNC: 56 U/L (ref 12–78)
AST SERPL-CCNC: 33 U/L (ref 15–37)
BASOPHILS # BLD AUTO: 0 TH/MM3 (ref 0–0.2)
BASOPHILS NFR BLD: 0.3 % (ref 0–2)
BILIRUB SERPL-MCNC: 1.5 MG/DL (ref 0.2–1)
BUN SERPL-MCNC: 14 MG/DL (ref 7–18)
CALCIUM SERPL-MCNC: 8.4 MG/DL (ref 8.5–10.1)
CHLORIDE SERPL-SCNC: 107 MEQ/L (ref 98–107)
CREAT SERPL-MCNC: 1.04 MG/DL (ref 0.6–1.3)
EOSINOPHIL # BLD: 0 TH/MM3 (ref 0–0.4)
EOSINOPHIL NFR BLD: 0 % (ref 0–4)
ERYTHROCYTE [DISTWIDTH] IN BLOOD BY AUTOMATED COUNT: 13.4 % (ref 11.6–17.2)
GFR SERPLBLD BASED ON 1.73 SQ M-ARVRAT: 62 ML/MIN (ref 89–?)
GLUCOSE SERPL-MCNC: 117 MG/DL (ref 74–106)
HCO3 BLD-SCNC: 25.6 MEQ/L (ref 21–32)
HCT VFR BLD CALC: 40.8 % (ref 39–51)
HGB BLD-MCNC: 14.1 GM/DL (ref 13–17)
LYMPHOCYTES # BLD AUTO: 1.2 TH/MM3 (ref 1–4.8)
LYMPHOCYTES NFR BLD AUTO: 8.4 % (ref 9–44)
MAGNESIUM SERPL-MCNC: 2 MG/DL (ref 1.5–2.5)
MCH RBC QN AUTO: 29 PG (ref 27–34)
MCHC RBC AUTO-ENTMCNC: 34.4 % (ref 32–36)
MCV RBC AUTO: 84.3 FL (ref 80–100)
MONOCYTE #: 1.1 TH/MM3 (ref 0–0.9)
MONOCYTES NFR BLD: 7.5 % (ref 0–8)
NEUTROPHILS # BLD AUTO: 12.2 TH/MM3 (ref 1.8–7.7)
NEUTROPHILS NFR BLD AUTO: 83.8 % (ref 16–70)
PHOSPHATE SERPL-MCNC: 3.7 MG/DL (ref 2.5–4.9)
PLATELET # BLD: 157 TH/MM3 (ref 150–450)
PMV BLD AUTO: 8.7 FL (ref 7–11)
PROT SERPL-MCNC: 6.3 GM/DL (ref 6.4–8.2)
RBC # BLD AUTO: 4.85 MIL/MM3 (ref 4.5–5.9)
SODIUM SERPL-SCNC: 143 MEQ/L (ref 136–145)
WBC # BLD AUTO: 14.6 TH/MM3 (ref 4–11)

## 2018-03-17 RX ADMIN — METOPROLOL TARTRATE SCH MG: 1 INJECTION, SOLUTION INTRAVENOUS at 12:36

## 2018-03-17 RX ADMIN — GABAPENTIN SCH MG: 100 CAPSULE ORAL at 08:47

## 2018-03-17 RX ADMIN — ENOXAPARIN SODIUM SCH MG: 40 INJECTION SUBCUTANEOUS at 22:29

## 2018-03-17 RX ADMIN — CHLORHEXIDINE GLUCONATE SCH PACK: 500 CLOTH TOPICAL at 04:00

## 2018-03-17 RX ADMIN — STANDARDIZED SENNA CONCENTRATE AND DOCUSATE SODIUM SCH TAB: 8.6; 5 TABLET, FILM COATED ORAL at 21:51

## 2018-03-17 RX ADMIN — STANDARDIZED SENNA CONCENTRATE AND DOCUSATE SODIUM SCH TAB: 8.6; 5 TABLET, FILM COATED ORAL at 08:47

## 2018-03-17 RX ADMIN — PHENYTOIN SODIUM SCH MLS/HR: 50 INJECTION INTRAMUSCULAR; INTRAVENOUS at 05:14

## 2018-03-17 RX ADMIN — GABAPENTIN SCH MG: 100 CAPSULE ORAL at 17:09

## 2018-03-17 RX ADMIN — Medication SCH ML: at 08:47

## 2018-03-17 RX ADMIN — GABAPENTIN SCH MG: 100 CAPSULE ORAL at 12:36

## 2018-03-17 RX ADMIN — Medication SCH ML: at 22:09

## 2018-03-17 RX ADMIN — METOPROLOL TARTRATE SCH MG: 1 INJECTION, SOLUTION INTRAVENOUS at 06:08

## 2018-03-17 NOTE — HHI.PR
Subjective


Remarks


Reviewed the case with the patient's mother at bedside.  History of traumatic 

brain injury and has been on prophylaxis antiepileptic takes.  He follows with 

neurologist, Dr. South.  He was taken off Keppra or year ago when he reportedly 

had an unremarkable EEG.  There has been no new prescribed medications.  No 

seizure activity overnight.


Patient has some cognitive deficits and left-sided hemiparesis.  He denies any 

complaints and states he wants to go home.  His mother provided much of the 

history.





Objective


Vitals





Vital Signs








  Date Time  Temp Pulse Resp B/P (MAP) Pulse Ox O2 Delivery O2 Flow Rate FiO2


 


3/17/18 06:00  118      


 


3/17/18 04:00 98.6 116 15 104/70 (81) 97   


 


3/17/18 04:00  116      


 


3/17/18 02:00  114      


 


3/17/18 00:00  120      


 


3/17/18 00:00 98.6 120 17 109/65 (80) 94   


 


3/16/18 23:00  130      


 


3/16/18 23:00 100.6 135 12 122/77 (92) 100   


 


3/16/18 21:00  130 15 114/60 (78) 100 Nasal Cannula 3.00 


 


3/16/18 19:40     99 Nasal Cannula 3.00 


 


3/16/18 19:38     99 Nasal Cannula 3 


 


3/16/18 19:11     98   35


 


3/16/18 18:20  115  126/75 (92)    


 


3/16/18 17:45  117  119/80 (93)    


 


3/16/18 17:00     98   100


 


3/16/18 16:32  134 15 147/93 (111) 100   


 


3/16/18 16:32  130      


 


3/16/18 16:23 98.3 130 18 149/84 (105) 90   


 


3/16/18 16:20     100   100














I/O      


 


 3/16/18 3/16/18 3/16/18 3/17/18 3/17/18 3/17/18





 07:00 15:00 23:00 07:00 15:00 23:00


 


Intake Total   170 ml 722 ml  


 


Output Total   325 ml 550 ml  


 


Balance   -155 ml 172 ml  


 


      


 


Intake Oral    50 ml  


 


IV Total   170 ml 672 ml  


 


Output Urine Total   325 ml 550 ml  








Result Diagram:  


3/17/18 0352                                                                   

             3/17/18 0352





Objective Remarks


GENERAL: Chronically ill-appearing male, in no apparent distress.


CARDIOVASCULAR: Heart rate around 105 and regular rhythm without murmurs, 

gallops, or rubs. 


RESPIRATORY: Good respiratory efforts. Breath sounds equal and clear to 

auscultation bilaterally.


GASTROINTESTINAL: Abdomen soft, non-tender, non-distended. Normal active bowel 

sounds


MUSCULOSKELETAL: Extremities without cyanosis, or edema.


NEURO: Left-sided hemiparesis with spasticity more pronounced on the left lower 

extremity.  Some cognitive dysfunction and lack of insight into his medical 

condition but can hold simple conversation.


PSYCH: Appropriate mood and affect.





A/P


Problem List:  


(1) Breakthrough seizure


ICD Code:  G40.919 - Epilepsy, unspecified, intractable, without status 

epilepticus


(2) Hx of traumatic brain injury


ICD Code:  Z87.820 - Personal history of traumatic brain injury


Status:  Acute


(3) Airway compromise


ICD Code:  J98.8 - Other specified respiratory disorders


Status:  Acute


Assessment and Plan


Patient presents after having a tonic-clonic seizure, he bit his tongue and was 

unable to protect his airway.  He was intubated in the emergency room.  History 

of seizure X1 around the time of his traumatic brain injury two years ago.  He 

was taken off Keppra a year ago.





Airway compromise:


-Patient was intubated in the emergency room.  He has been quickly weaned off 

and extubated.  Currently on room air.





Seizure disorder: History of TBI, he has a shunt.


- Loaded with 1 g of fosphenytoin in the ED


- Keppra 500 by mouth twice a day


- Will obtain EEG and ask Neurology to see him.  He probably needs to stay on 

antiepileptics.


- Ativan when necessary





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 

hemiparesis and spasticity


- Supportive care


-We'll need to continue outpatient PT and OT


- Gabapentin





FeverX1 yesterday evening:


- No infectious focus but will continue to monitor to ensure he remains 

afebrile at least for 24hrs. 





Insomnia:


- Restoril as needed





DVT prophylaxis


- Subcutaneous Lovenox


- Stool softener as needed.


Discharge Planning


OK to transfer to floor.











Socorro Rocha MD Mar 17, 2018 08:35

## 2018-03-17 NOTE — PD.CONS
History of Present Illness


Service


Neurology


Consult Requested By


medical


Reason for Consult


seizure


Primary Care Physician


Unknown


History of Present Illness


23 y/o m admitted for recurrent seizures. witnessed by mother to be confused 

slumped over. evac called and had witnessed sz by them. stable overnight. 

intubated in er and extubated. loaded with cerebryx and keppra.


hx of tbi and shunt with residual left hemipleg[place] from [place] [facility] [place] on keppra but taken off after 1 year as repeat eeg was nml. 


has been sleep deprived. requesting hypnotic. 


[place] [facility] [place] 








Review of Systems


ROS


10 point negative rest as above





Past Family Social History


Allergies:  


Coded Allergies:  


     morphine (Verified  Allergy, Severe, 3/16/18)


Past Medical History


Traumatic brain injury


Past Surgical History


Craniotomy and  shunt


Reported Medications





Reported Meds & Active Scripts


Active


Reported


Gabapentin 100 Mg Cap 100 Mg PO TID





Family History


No family history significant of seizure disorder





Social History


No alcohol, tobacco, or illicit drug abuse





Review of Systems


All other ROS:  ROS reviewed as documented in chart





Past Family Social History


Allergies:  


Coded Allergies:  


     morphine (Verified  Allergy, Severe, 3/16/18)


Active Ordered Medications





Current Medications








 Medications


  (Trade)  Dose


 Ordered  Sig/Josi


 Route  Start Time


 Stop Time Status Last Admin


 


 Propofol  100 ml @ 0


 mls/hr  TITRATE  PRN


 IV  3/16/18 16:30


    3/16/18 17:51


 


 


  (Neurontin)  100 mg  TID


 PO  3/17/18 09:00


    3/17/18 08:47


 


 


  (NS Flush)  2 ml  UNSCH  PRN


 IV FLUSH  3/16/18 19:15


     


 


 


  (NS Flush)  2 ml  BID


 IV FLUSH  3/16/18 21:00


    3/17/18 08:47


 


 


  (Tylenol)  650 mg  Q6H  PRN


 PO  3/16/18 19:15


    3/16/18 23:09


 


 


  (Ativan Inj)  1 mg  Q1H  PRN


 IV PUSH  3/16/18 19:15


     


 


 


  (Zofran Inj)  4 mg  Q6H  PRN


 IV PUSH  3/16/18 19:15


     


 


 


  (Ambien)  5 mg  HS  PRN


 PO  3/16/18 19:15


    3/16/18 23:09


 


 


  (Duoneb Neb)  1 ampule  Q2HR NEB  PRN


 INH  3/16/18 19:15


     


 


 


  (Lovenox Inj)  40 mg  Q24H


 SQ  3/16/18 21:00


    3/16/18 23:09


 


 


 Miscellaneous


 Information  1  Q361D


 XX  3/16/18 19:15


     


 


 


  (Chlorhexidine


 2% Cloth)  3 pack


 Taper  DAILY@04


 TOP  3/17/18 04:00


 3/13/19 03:59   


 


 


  (Chlorhexidine


 2% Cloth)  3 pack  UNSCH  PRN


 TOP  3/16/18 19:15


     


 


 


  (Elizabeth-Colace)  1 tab  BID


 PO  3/16/18 21:00


    3/17/18 08:47


 


 


  (Milk Of


 Magnesia Liq)  30 ml  Q12H  PRN


 PO  3/16/18 19:15


     


 


 


  (Senokot)  17.2 mg  Q12H  PRN


 PO  3/16/18 19:15


     


 


 


  (Dulcolax Supp)  10 mg  DAILY  PRN


 RECTAL  3/16/18 19:15


     


 


 


  (Lactulose Liq)  30 ml  DAILY  PRN


 PO  3/16/18 19:15


     


 


 


  (Keppra)  500 mg  Q12HR


 PO  3/16/18 21:00


    3/17/18 08:47


 


 


  (Lopressor Inj)  5 mg  Q6H


 IV PUSH  3/16/18 23:45


 3/17/18 11:46  3/17/18 06:08


 


 


  (Restoril)  15 mg  HS  PRN


 PO  3/17/18 21:00


     


 











Exam


I&O / VS





Vital Signs








  Date Time  Temp Pulse Resp B/P (MAP) Pulse Ox O2 Delivery O2 Flow Rate FiO2


 


3/17/18 06:00  118      


 


3/17/18 04:00 98.6 116 15 104/70 (81) 97   


 


3/17/18 04:00  116      


 


3/17/18 02:00  114      


 


3/17/18 00:00  120      


 


3/17/18 00:00 98.6 120 17 109/65 (80) 94   


 


3/16/18 23:00  130      


 


3/16/18 23:00 100.6 135 12 122/77 (92) 100   


 


3/16/18 21:00  130 15 114/60 (78) 100 Nasal Cannula 3.00 


 


3/16/18 19:40     99 Nasal Cannula 3.00 


 


3/16/18 19:38     99 Nasal Cannula 3 


 


3/16/18 19:11     98   35


 


3/16/18 18:20  115  126/75 (92)    


 


3/16/18 17:45  117  119/80 (93)    


 


3/16/18 17:00     98   100


 


3/16/18 16:32  134 15 147/93 (111) 100   


 


3/16/18 16:32  130      


 


3/16/18 16:23 98.3 130 18 149/84 (105) 90   


 


3/16/18 16:20     100   100








General:  Alert and Oriented, No acute distress


Eye:  EOMI


Respiratory:  Non-labored respirations


Cardiology:  Normal rate


Neurologic:  Alert, Oriented


Psychiatric:  Cooperative


Exam Comments


ox 3, monotone speech/flat affect, eomi, vff, left lateral tongue lac, left 

spastic hemiparesis with brisk reflexes and left ankle clonus





Review/Management


Diagnosis/Plan:  


(1) Breakthrough seizure


ICD Codes:  G40.919 - Epilepsy, unspecified, intractable, without status 

epilepticus


Status:  Acute


Plan:  post-traumatic


insomnia may be contributory





recs


resume keppra


valium qhs for spasms and insomnia


eeg





d/c planning from am if he remains sz free


f/u with his neurologist/rehab md





(2) Hx of traumatic brain injury


ICD Codes:  Z87.820 - Personal history of traumatic brain injury


Status:  Chronic


Plan:  4/2016 with  shunt, residual left spastic hemiparesis














Santiago Callahan MD Mar 17, 2018 09:58

## 2018-03-17 NOTE — MG
cc:

Santiago Callahan MD

****

 

 

 

EEG RECORD #

 

A 22-year-old, history of previous traumatic brain injury, seizure.

 

Asymmetric right hemispheric slowing, 1-3 Hz in the right hemisphere. 

Left side some delta and alpha, beta frequencies, 20-70 microvolts.  

Some blinking artifact.  Photic stimulation reduced driving.  Single 

lead EKG showing sinus rhythm.

 

INTERPRETATION:

Moderate asymmetric right hemispheric slowing suggestive of structural

lesion.  No active seizure activity.  Clinical correlation.

 

 

__________________________________

Santiago Callahan MD

 

 

MG/rt

D: 03/17/2018, 09:09 PM

T: 03/17/2018, 09:25 PM

Visit #: [id number]

Job #: [id number]

## 2018-03-18 VITALS
SYSTOLIC BLOOD PRESSURE: 109 MMHG | HEART RATE: 113 BPM | TEMPERATURE: 98.7 F | DIASTOLIC BLOOD PRESSURE: 59 MMHG | RESPIRATION RATE: 18 BRPM | OXYGEN SATURATION: 96 %

## 2018-03-18 VITALS
DIASTOLIC BLOOD PRESSURE: 63 MMHG | TEMPERATURE: 98.9 F | RESPIRATION RATE: 16 BRPM | SYSTOLIC BLOOD PRESSURE: 113 MMHG | HEART RATE: 103 BPM | OXYGEN SATURATION: 97 %

## 2018-03-18 RX ADMIN — GABAPENTIN SCH MG: 100 CAPSULE ORAL at 08:50

## 2018-03-18 RX ADMIN — CHLORHEXIDINE GLUCONATE SCH PACK: 500 CLOTH TOPICAL at 04:00

## 2018-03-18 RX ADMIN — STANDARDIZED SENNA CONCENTRATE AND DOCUSATE SODIUM SCH TAB: 8.6; 5 TABLET, FILM COATED ORAL at 08:50

## 2018-03-18 NOTE — HHI.DS
__________________________________________________





Discharge Summary


Admission Date


Mar 16, 2018 at 18:37


Discharge Date:  Mar 18, 2018


Admitting Diagnosis





status epilepticus,loss of airway, hx TBI





(1) Breakthrough seizure


ICD Code:  G40.919 - Epilepsy, unspecified, intractable, without status 

epilepticus


Status:  Acute


(2) Hx of traumatic brain injury


ICD Code:  Z87.820 - Personal history of traumatic brain injury


Status:  Chronic


(3) Airway compromise


ICD Code:  J98.8 - Other specified respiratory disorders


Status:  Acute


Procedures


None


Brief History - From Admission


HPI from the admitting physician


Young gentleman with the history of traumatic brain injury 2 years ago, he was 

on a Procedure prophylaxis for about 1 year, he presents today with the new 

onset of witnessed seizure activity. He was witnessed to have tonic-clonic 

shaking activity in the extremities by paramedics.  He bit his tongue and was 

having a lot of bleeding in the oral cavity.  He could not control his airway.  

There was attempt by paramedics to intubate a patient however unsuccessful.  

Patient received etomidate just prior to arrival to emergency department, was 

backed and was intubated by ED attending for an airway protection.


CBC/BMP:  


3/17/18 0352                                                                   

             3/17/18 0352





Significant Findings





Laboratory Tests








Test


  3/16/18


17:15 3/16/18


17:30 3/16/18


17:34 3/16/18


23:20


 


Random Glucose


  128 MG/DL


() 


  


  


 


 


Carbon Dioxide Level


  20.0 MEQ/L


(21.0-32.0) 


  


  


 


 


Anion Gap


  19 MEQ/L


(5-15) 


  


  


 


 


Estimat Glomerular Filtration


Rate 52 ML/MIN


(>89) 


  


  


 


 


Urine Benzodiazepines Screen  POS (NEG)   


 


Arterial Blood Partial


Pressure O2 


  


  303 mmHG


() 


 


 


Arterial Blood Oxygen Content


  


  


  22.7 Vol %


(12.0-20.0) 


 


 


Test


  3/17/18


03:52 


  


  


 


 


White Blood Count


  14.6 TH/MM3


(4.0-11.0) 


  


  


 


 


Neutrophils (%) (Auto)


  83.8 %


(16.0-70.0) 


  


  


 


 


Lymphocytes (%) (Auto)


  8.4 %


(9.0-44.0) 


  


  


 


 


Neutrophils # (Auto)


  12.2 TH/MM3


(1.8-7.7) 


  


  


 


 


Monocytes # (Auto)


  1.1 TH/MM3


(0-0.9) 


  


  


 


 


Random Glucose


  117 MG/DL


() 


  


  


 


 


Total Protein


  6.3 GM/DL


(6.4-8.2) 


  


  


 


 


Calcium Level


  8.4 MG/DL


(8.5-10.1) 


  


  


 


 


Total Bilirubin


  1.5 MG/DL


(0.2-1.0) 


  


  


 


 


Estimat Glomerular Filtration


Rate 62 ML/MIN


(>89) 


  


  


 








Imaging





Last Impressions








Head CT 3/16/18 0000 Signed





Impressions: 





 Service Date/Time:  Friday, March 16, 2018 17:12 - CONCLUSION:  1. No acute 





 cranial hemorrhage. 2. 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 





 and right temporal cortex. 3. No acute intracranial abnormality identified.   

  





 Chirag Rosa MD 


 


Chest X-Ray 3/16/18 0000 Signed





Impressions: 





 Service Date/Time:  Friday, March 16, 2018 16:36 - CONCLUSION:  1. Support 





 apparatus as above. Mild basilar airspace disease.     Eron Abreu MD 








PE at Discharge


GENERAL: Chronically ill-appearing male, in no apparent distress.


CARDIOVASCULAR: Heart rate around 105 and regular rhythm without murmurs, 

gallops, or rubs. 


RESPIRATORY: Good respiratory efforts. Breath sounds equal and clear to 

auscultation bilaterally.


GASTROINTESTINAL: Abdomen soft, non-tender, non-distended. Normal active bowel 

sounds


MUSCULOSKELETAL: Extremities without cyanosis, or edema.


NEURO: Left-sided hemiparesis with spasticity more pronounced on the left lower 

extremity.  Some cognitive dysfunction and lack of insight into his medical 

condition but can hold simple conversation.


PSYCH: Appropriate mood and affect.


Pt update on day of discharge


Patient reports he is feeling okay.  He wants his IV out and wants to go home.  

No further seizure activities.  Discussed discharge planning at length with the 

patient's mother and father at bedside.


Hospital Course


Patient presents after having a tonic-clonic seizure, he bit his tongue and was 

unable to protect his airway.  He was intubated in the emergency room.  History 

of seizure X1 around the time of his traumatic brain injury two years ago.  He 

was taken off Keppra a year ago.  Treatment course detailed below:





Airway compromise:


-Patient was intubated in the emergency room.  He has been quickly weaned off 

and extubated.  He was back to his baseline on room air by the time of 

discharge.





Seizure disorder: History of TBI, he has a shunt.


- Loaded with 1 g of fosphenytoin in the ED


-Patient was seen by neurology.  He is to continue on Keppra 750 mg twice daily 

and follow up outpatient with his neurologist.





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 

hemiparesis and spasticity


- Supportive care


-Outpatient follow-up





Insomnia:


-Low-dose Valium was added to help with insomnia and spasticity.


Pt Condition on Discharge:  Good


Discharge Disposition:  Discharge Home


Discharge Time:  <= 30 minutes


Discharge Instructions


DIET: Follow Instructions for:  As Tolerated, No Restrictions


Activities you can perform:  Regular-No Restrictions


Follow up Referrals:  


Neurology - 2 Weeks





New Medications:  


Diazepam (Valium) 2 Mg Tab


2 MG PO HS PRN for insomnia , #15 TAB





Levetiracetam (Keppra) 250 Mg Tab


750 MG PO Q12HR, #60 TAB





 


Continued Medications:  


Gabapentin (Gabapentin) 100 Mg Cap


100 MG PO TID, #90 CAP 0 Refills

















Socorro Rocha MD Mar 18, 2018 11:50

## 2018-03-18 NOTE — HHI.PR
Review/Management


Diagnosis/Plan:  


(1) Breakthrough seizure


ICD Codes:  G40.919 - Epilepsy, unspecified, intractable, without status 

epilepticus


Status:  Acute


Plan:  post-traumatic


insomnia may be contributory





recs


on keppra restarted





eeg- no active sz





d/c planning today 


f/u with his neurologist/rehab md





d/w pt/mother





(2) Hx of traumatic brain injury


ICD Codes:  Z87.820 - Personal history of traumatic brain injury


Status:  Chronic


Plan:  4/2016 with  shunt, residual left spastic hemiparesis








Subjective


Subjective Comments


No acute events reported. " can i go home today?"


No headache


No chest pain


No dyspnea


Active Medications





Current Medications








 Medications


  (Trade)  Dose


 Ordered  Sig/Josi


 Route  Start Time


 Stop Time Status Last Admin


 


 Propofol  100 ml @ 0


 mls/hr  TITRATE  PRN


 IV  3/16/18 16:30


    3/16/18 17:51


 


 


  (Neurontin)  100 mg  TID


 PO  3/17/18 09:00


    3/18/18 08:50


 


 


  (NS Flush)  2 ml  UNSCH  PRN


 IV FLUSH  3/16/18 19:15


     


 


 


  (NS Flush)  2 ml  BID


 IV FLUSH  3/16/18 21:00


    3/17/18 22:09


 


 


  (Tylenol)  650 mg  Q6H  PRN


 PO  3/16/18 19:15


    3/16/18 23:09


 


 


  (Ativan Inj)  1 mg  Q1H  PRN


 IV PUSH  3/16/18 19:15


     


 


 


  (Zofran Inj)  4 mg  Q6H  PRN


 IV PUSH  3/16/18 19:15


     


 


 


  (Duoneb Neb)  1 ampule  Q2HR NEB  PRN


 INH  3/16/18 19:15


     


 


 


  (Lovenox Inj)  40 mg  Q24H


 SQ  3/16/18 21:00


    3/16/18 23:09


 


 


 Miscellaneous


 Information  1  Q361D


 XX  3/16/18 19:15


     


 


 


  (Chlorhexidine


 2% Cloth)  3 pack


 Taper  DAILY@04


 TOP  3/17/18 04:00


 3/13/19 03:59   


 


 


  (Chlorhexidine


 2% Cloth)  3 pack  UNSCH  PRN


 TOP  3/16/18 19:15


     


 


 


  (Elizabeth-Colace)  1 tab  BID


 PO  3/16/18 21:00


    3/18/18 08:50


 


 


  (Milk Of


 Magnesia Liq)  30 ml  Q12H  PRN


 PO  3/16/18 19:15


     


 


 


  (Senokot)  17.2 mg  Q12H  PRN


 PO  3/16/18 19:15


     


 


 


  (Dulcolax Supp)  10 mg  DAILY  PRN


 RECTAL  3/16/18 19:15


     


 


 


  (Lactulose Liq)  30 ml  DAILY  PRN


 PO  3/16/18 19:15


     


 


 


  (Keppra)  750 mg  Q12HR


 PO  3/17/18 21:00


    3/17/18 21:52


 


 


  (Valium)  2 mg  HS  PRN


 PO  3/17/18 10:00


    3/17/18 21:51


 








Allergies





Allergies


Coded Allergies


  morphine (Verified Allergy, Severe, 3/16/18)





Review of Systems


All other ROS:  ROS reviewed as documented in chart





Exam


I&O / VS





Vital Signs








  Date Time  Temp Pulse Resp B/P (MAP) Pulse Ox O2 Delivery O2 Flow Rate FiO2


 


3/18/18 08:00 98.9 103 16 113/63 (80) 97   


 


3/18/18 00:59 98.7 113 18 109/59 (76) 96   


 


3/17/18 21:52      Room Air  


 


3/17/18 21:50 98.9       


 


3/17/18 21:03 99.4 111 16 112/69 (83) 95   


 


3/17/18 18:47 99.5       


 


3/17/18 16:00 97.9 102 16 109/73 (85) 98   


 


3/17/18 12:00 98.7 104 12 101/63 (76) 97   








General:  Alert and Oriented, No acute distress


Eye:  EOMI


Respiratory:  Non-labored respirations


Cardiology:  Normal rate


Neurologic:  Alert, Oriented


Psychiatric:  Cooperative


Exam Comments


ox 3, monotone speech/flat affect, eomi, left spastic hemiparesis with brisk 

reflexes and left ankle clonus











Santiago Callahan MD Mar 18, 2018 10:57

## 2018-03-18 NOTE — HHI.DCPOC
Discharge Care Plan


Diagnosis:  


(1) Status epilepticus


(2) Breakthrough seizure


(3) Hx of traumatic brain injury


(4) Airway compromise


Goals to Promote Your Health


* To prevent worsening of your condition and complications


* To maintain your health at the optimal level


Directions to Meet Your Goals


*** Take your medications as prescribed


*** Follow your dietary instruction


*** Follow activity as directed








*** Keep your appointments as scheduled


*** Take your immunizations and boosters as scheduled


*** If your symptoms worsen call your PCP, if no PCP go to Urgent Care Center 

or Emergency Room***


*** Smoking is Dangerous to Your Health. Avoid second hand smoke***


***Call the 24-hour hour crisis hotline for domestic abuse at 1-421.888.6847***











Socorro Rocha MD Mar 18, 2018 11:50